# Patient Record
Sex: FEMALE | Race: OTHER | NOT HISPANIC OR LATINO | URBAN - METROPOLITAN AREA
[De-identification: names, ages, dates, MRNs, and addresses within clinical notes are randomized per-mention and may not be internally consistent; named-entity substitution may affect disease eponyms.]

---

## 2017-01-24 ENCOUNTER — IMPORTED ENCOUNTER (OUTPATIENT)
Dept: URBAN - METROPOLITAN AREA CLINIC 1 | Facility: CLINIC | Age: 77
End: 2017-01-24

## 2017-01-24 PROBLEM — H04.123: Noted: 2017-01-24

## 2017-01-24 PROBLEM — H26.493: Noted: 2017-01-24

## 2017-01-24 PROCEDURE — 92012 INTRM OPH EXAM EST PATIENT: CPT

## 2017-01-24 NOTE — PATIENT DISCUSSION
1.  Dry Eyes OU -- Recommended to patient to use Artificial Tears TID-QID OU and restart Restasis BID OU (eRX today)2. PCO OU: (Posterior Capsule Opacification)   Observe and consider yag cap when pt feels pco visually significant and visual acuity decreases to appropriate level. 3. Return for an appointment in 1 month for 10 DFE. with Dr. Fermin Wright.  (reevaluate PCO)

## 2017-02-24 ENCOUNTER — IMPORTED ENCOUNTER (OUTPATIENT)
Dept: URBAN - METROPOLITAN AREA CLINIC 1 | Facility: CLINIC | Age: 77
End: 2017-02-24

## 2017-02-24 PROBLEM — H26.493: Noted: 2017-02-24

## 2017-02-24 PROBLEM — H01.004: Noted: 2017-02-24

## 2017-02-24 PROBLEM — Z96.1: Noted: 2017-02-24

## 2017-02-24 PROBLEM — H04.123: Noted: 2017-02-24

## 2017-02-24 PROBLEM — H01.001: Noted: 2017-02-24

## 2017-02-24 PROBLEM — H16.143: Noted: 2017-02-24

## 2017-02-24 PROCEDURE — 92012 INTRM OPH EXAM EST PATIENT: CPT

## 2017-02-24 NOTE — PATIENT DISCUSSION
1.  PCO OU - PMG saw the patient today. Visually Significant and yag cap recommended. Risks and benefits discussed with pt and pt desires to schedule yag cap. YAG Cap OD then OS. 2.  Pseudophakia OU 3. 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 4.  Blepharitis anterior type OU - Daily warm compresses and lid scrubs were recommended. 5. H/o DM II without sign of diabetic retinopathy6. H/o Grade I HTN RetinopathyReturn for an appointment in YAG Cap OS then OD with Dr. Sung Dominguez.

## 2017-04-07 ENCOUNTER — IMPORTED ENCOUNTER (OUTPATIENT)
Dept: URBAN - METROPOLITAN AREA CLINIC 1 | Facility: CLINIC | Age: 77
End: 2017-04-07

## 2017-04-07 PROBLEM — H26.491: Noted: 2017-04-07

## 2017-04-07 PROCEDURE — 66821 AFTER CATARACT LASER SURGERY: CPT

## 2017-04-07 NOTE — PATIENT DISCUSSION
YAG CAP OD: The purpose and nature of the procedure possible alternative methods of treatment the risks involved and the possibility of complications were discussed with patient. The Patient wishes to proceed and the consent was signed. The laser was then performed under topical anesthesia with no complications. Post op instructions were given to patient as well as a follow-up appointment. Patient was advised to call our office if any questions or concerns. Return for an appointment for 2nd eye as scheduled with Dr. Bienvenido Guillermo.

## 2017-04-21 ENCOUNTER — IMPORTED ENCOUNTER (OUTPATIENT)
Dept: URBAN - METROPOLITAN AREA CLINIC 1 | Facility: CLINIC | Age: 77
End: 2017-04-21

## 2017-04-21 PROBLEM — H26.492: Noted: 2017-04-21

## 2017-04-21 PROCEDURE — 66821 AFTER CATARACT LASER SURGERY: CPT

## 2017-04-21 NOTE — PATIENT DISCUSSION
YAG CAP OS: (Consent signed and scanned into attachments) 1 gtt Prolensa applied. The purpose and nature of the procedure possible alternative methods of treatment the risks involved and the possibility of complications were discussed with patient. The Patient wishes to proceed and the consent was signed. The laser was then performed under topical anesthesia with no complications. Post op instructions were given to patient as well as a follow-up appointment. Patient was advised to call our office if any questions or concerns.  RTC as scheduled for PO Yag OU

## 2017-06-09 ENCOUNTER — IMPORTED ENCOUNTER (OUTPATIENT)
Dept: URBAN - METROPOLITAN AREA CLINIC 1 | Facility: CLINIC | Age: 77
End: 2017-06-09

## 2017-06-09 PROCEDURE — 99024 POSTOP FOLLOW-UP VISIT: CPT

## 2017-06-09 NOTE — PATIENT DISCUSSION
PO YAG Cap OU: good result. MRX optional Return for an appointment in 4-6 mo 30 with Dr. Steph Matos.

## 2017-10-23 ENCOUNTER — IMPORTED ENCOUNTER (OUTPATIENT)
Dept: URBAN - METROPOLITAN AREA CLINIC 1 | Facility: CLINIC | Age: 77
End: 2017-10-23

## 2017-10-23 PROBLEM — E11.9: Noted: 2017-10-23

## 2017-10-23 PROBLEM — Z96.1: Noted: 2017-10-23

## 2017-10-23 PROBLEM — Z79.84: Noted: 2017-10-23

## 2017-10-23 PROBLEM — H35.033: Noted: 2017-10-23

## 2017-10-23 PROBLEM — H01.001: Noted: 2017-10-23

## 2017-10-23 PROBLEM — H01.004: Noted: 2017-10-23

## 2017-10-23 PROBLEM — H16.143: Noted: 2017-10-23

## 2017-10-23 PROBLEM — H04.123: Noted: 2017-10-23

## 2017-10-23 PROCEDURE — 92014 COMPRE OPH EXAM EST PT 1/>: CPT

## 2017-10-23 NOTE — PATIENT DISCUSSION
1.  DM Type II (Oral Medication) without sign of diabetic retinopathy and no blot heme on dilated retinal examination today OU No Macular Edema:  Discussed the pathophysiology of diabetes and its effect on the eye and risk of blindness. Stressed the importance of strong glucose control. Advised of importance of at least yearly dilated examinations but to contact us immediately for any problems or concerns. 2. 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 Recommend ATs BID-QID OU routinely (sample of Systane Balance given)3. Pseudophakia OU - h/o YAG Cap OU 4. Posterior Blepharitis OU - Daily warm compresses and lid scrubs were recommended. 5. GR I Hypertensive Retinopathy OU- Stable continue HTN ControlPatient defers the refraction at today's visitReturn for an appointment in 6 months 10 (check ks) with Dr. Juarez Ashley.

## 2018-04-23 ENCOUNTER — IMPORTED ENCOUNTER (OUTPATIENT)
Dept: URBAN - METROPOLITAN AREA CLINIC 1 | Facility: CLINIC | Age: 78
End: 2018-04-23

## 2018-04-23 PROBLEM — H16.143: Noted: 2018-04-23

## 2018-04-23 PROBLEM — H04.123: Noted: 2018-04-23

## 2018-04-23 PROCEDURE — 99213 OFFICE O/P EST LOW 20 MIN: CPT

## 2018-04-23 NOTE — PATIENT DISCUSSION
1.  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 Recommend ATs BID-QID OU routinely. Consider Les QHS OU if symptoms worsen. 2. Pseudophakia OU - h/o YAG Cap OU 3. H/o DM w/o DR OU 4. Posterior Blepharitis OU - Daily warm compresses and lid scrubs were recommended. 5. H/o GR I Hypertensive Retinopathy OUReturn for an appointment in 6 months 27 with Dr. Rissa Ortiz.

## 2018-10-30 ENCOUNTER — IMPORTED ENCOUNTER (OUTPATIENT)
Dept: URBAN - METROPOLITAN AREA CLINIC 1 | Facility: CLINIC | Age: 78
End: 2018-10-30

## 2018-10-30 PROBLEM — H01.024: Noted: 2018-10-30

## 2018-10-30 PROBLEM — Z96.1: Noted: 2018-10-30

## 2018-10-30 PROBLEM — H01.021: Noted: 2018-10-30

## 2018-10-30 PROBLEM — H04.123: Noted: 2018-10-30

## 2018-10-30 PROBLEM — H01.022: Noted: 2018-10-30

## 2018-10-30 PROBLEM — H16.143: Noted: 2018-10-30

## 2018-10-30 PROBLEM — H01.004: Noted: 2018-10-30

## 2018-10-30 PROBLEM — H01.005: Noted: 2018-10-30

## 2018-10-30 PROBLEM — Z79.84: Noted: 2018-10-30

## 2018-10-30 PROBLEM — E11.9: Noted: 2018-10-30

## 2018-10-30 PROBLEM — H01.025: Noted: 2018-10-30

## 2018-10-30 PROBLEM — H35.033: Noted: 2018-10-30

## 2018-10-30 PROBLEM — H01.002: Noted: 2018-10-30

## 2018-10-30 PROBLEM — H01.001: Noted: 2018-10-30

## 2018-10-30 PROCEDURE — 92014 COMPRE OPH EXAM EST PT 1/>: CPT

## 2018-10-30 PROCEDURE — 92015 DETERMINE REFRACTIVE STATE: CPT

## 2018-10-30 NOTE — PATIENT DISCUSSION
1.  DM Type II (Oral Meds) -- Without sign of diabetic retinopathy and no blot heme on dilated retinal examination today OU and no Macular Edema OU: Discussed the pathophysiology of diabetes and its effect on the eye and risk of blindness. Stressed the importance of strong glucose control. Advised of importance of at least yearly dilated examinations but to contact us immediately for any problems or concerns. 2. BE w/ PEK OU -- Recommend the frequent use of OTC AT's BID-QID OU. Continue Restasis BID OU. Consider Les QHS OU if symptoms worsen. 3. GR I Hypertensive Retinopathy OU -- Stable continue HTN Control4. Anterior Blepharitis OU - Daily Hot compresses and lid scrubs were recommended. 5. Pseudophakia w/ h/o YAG Cap OU -- Doing well. Letter to PCP. Finalized Glasses MRx & given to patient today. Return for an appointment in 1 YR for a 30 OU with Dr. Karli Maldonado.

## 2022-03-14 ENCOUNTER — COMPREHENSIVE EXAM (OUTPATIENT)
Dept: URBAN - METROPOLITAN AREA CLINIC 1 | Facility: CLINIC | Age: 82
End: 2022-03-14

## 2022-03-14 DIAGNOSIS — H04.123: ICD-10-CM

## 2022-03-14 DIAGNOSIS — H16.143: ICD-10-CM

## 2022-03-14 DIAGNOSIS — E11.9: ICD-10-CM

## 2022-03-14 DIAGNOSIS — Z96.1: ICD-10-CM

## 2022-03-14 PROCEDURE — 99214 OFFICE O/P EST MOD 30 MIN: CPT

## 2022-03-14 NOTE — PATIENT DISCUSSION
Continue Restasis BID OU (erx'd). In addition to Restasis, continue the use of ATs BID-TID OU and LAUREN ATs QHS.

## 2022-03-14 NOTE — PATIENT DISCUSSION
Without sign of diabetic retinopathy and no blot heme on dilated retinal examination today OU and no Macular Edema OU: Discussed the pathophysiology of diabetes and its effect on the eye and risk of blindness. Stressed the importance of strong glucose control. Advised of importance of at least yearly dilated examinations but to contact us immediately for any problems or concerns.

## 2022-04-02 ASSESSMENT — TONOMETRY
OD_IOP_MMHG: 12
OD_IOP_MMHG: 12
OS_IOP_MMHG: 12
OS_IOP_MMHG: 12
OD_IOP_MMHG: 13
OS_IOP_MMHG: 12
OD_IOP_MMHG: 12
OD_IOP_MMHG: 12
OS_IOP_MMHG: 11
OS_IOP_MMHG: 12
OD_IOP_MMHG: 11
OS_IOP_MMHG: 13

## 2022-04-02 ASSESSMENT — VISUAL ACUITY
OD_CC: 20/20
OD_CC: J1
OD_CC: 20/20
OD_SC: 20/20
OS_CC: 20/20
OD_SC: 20/20
OS_CC: 20/30
OS_CC: 20/30
OS_SC: 20/20
OS_SC: 20/20
OS_CC: J1
OS_SC: 20/20
OS_SC: 20/25+2
OS_GLARE: 20/400
OD_GLARE: 20/400
OD_SC: 20/20
OD_SC: 20/25
OD_CC: 20/20

## 2022-04-02 ASSESSMENT — KERATOMETRY
OD_K2POWER_DIOPTERS: 43.75
OS_K1POWER_DIOPTERS: 42.50
OD_AXISANGLE2_DEGREES: 084
OS_AXISANGLE_DEGREES: 154
OS_AXISANGLE2_DEGREES: 064
OD_K1POWER_DIOPTERS: 42.50
OD_AXISANGLE_DEGREES: 174
OS_K2POWER_DIOPTERS: 44.00

## 2024-03-07 ENCOUNTER — COMPREHENSIVE EXAM (OUTPATIENT)
Dept: URBAN - METROPOLITAN AREA CLINIC 1 | Facility: CLINIC | Age: 84
End: 2024-03-07

## 2024-03-07 DIAGNOSIS — H16.143: ICD-10-CM

## 2024-03-07 DIAGNOSIS — E11.9: ICD-10-CM

## 2024-03-07 DIAGNOSIS — H04.123: ICD-10-CM

## 2024-03-07 PROCEDURE — 99214 OFFICE O/P EST MOD 30 MIN: CPT

## 2024-03-07 ASSESSMENT — VISUAL ACUITY
OD_SC: J2
OD_SC: 20/20
OS_SC: 20/20
OS_SC: J2

## 2024-03-07 ASSESSMENT — TONOMETRY
OD_IOP_MMHG: 13
OS_IOP_MMHG: 13

## 2024-10-07 DIAGNOSIS — R01.1 HEART MURMUR: Primary | ICD-10-CM

## 2025-03-10 ENCOUNTER — COMPREHENSIVE EXAM (OUTPATIENT)
Age: 85
End: 2025-03-10

## 2025-03-10 DIAGNOSIS — H01.024: ICD-10-CM

## 2025-03-10 DIAGNOSIS — H01.021: ICD-10-CM

## 2025-03-10 DIAGNOSIS — H04.123: ICD-10-CM

## 2025-03-10 DIAGNOSIS — E11.9: ICD-10-CM

## 2025-03-10 DIAGNOSIS — H16.143: ICD-10-CM

## 2025-03-10 DIAGNOSIS — Z96.1: ICD-10-CM

## 2025-03-10 PROCEDURE — 99214 OFFICE O/P EST MOD 30 MIN: CPT
